# Patient Record
Sex: MALE | Race: WHITE | URBAN - METROPOLITAN AREA
[De-identification: names, ages, dates, MRNs, and addresses within clinical notes are randomized per-mention and may not be internally consistent; named-entity substitution may affect disease eponyms.]

---

## 2017-04-12 ENCOUNTER — OFFICE VISIT (OUTPATIENT)
Dept: PODIATRY | Facility: CLINIC | Age: 22
End: 2017-04-12
Payer: COMMERCIAL

## 2017-04-12 VITALS
HEIGHT: 76 IN | SYSTOLIC BLOOD PRESSURE: 133 MMHG | DIASTOLIC BLOOD PRESSURE: 75 MMHG | WEIGHT: 179 LBS | BODY MASS INDEX: 21.8 KG/M2

## 2017-04-12 DIAGNOSIS — B07.0 VERRUCA PLANTARIS: Primary | ICD-10-CM

## 2017-04-12 PROCEDURE — 17110 DESTRUCTION B9 LES UP TO 14: CPT | Performed by: PODIATRIST

## 2017-04-12 PROCEDURE — 99202 OFFICE O/P NEW SF 15 MIN: CPT | Mod: 25 | Performed by: PODIATRIST

## 2017-04-12 NOTE — PROGRESS NOTES
"Subjective:    Pt is seen today as a new pt referral with the c/c of painful lesions on the plantar aspect right foot.  This has been problematic for 4 month(s).  Pain is aggrevated by activity and relieved by rest.  Describes as burning pain and this is slowly getting worse.  Has tried OTC products without alleviation of lesion/symptoms.  Cryotherapy once before.  Patient is requesting more definitive treatment.    ROS: denies drainage, denies trauma, denies numbness, denies erythema or edema      No Known Allergies    No current outpatient prescriptions on file.       There is no problem list on file for this patient.      History reviewed. No pertinent past medical history.    History reviewed. No pertinent surgical history.    Family History   Problem Relation Age of Onset     DIABETES No family hx of      Coronary Artery Disease No family hx of        Social History   Substance Use Topics     Smoking status: Never Smoker     Smokeless tobacco: Not on file     Alcohol use Not on file         Exam:    /75 (BP Location: Right arm, Patient Position: Chair, Cuff Size: Adult Regular)  Ht 1.918 m (6' 3.5\")  Wt 81.2 kg (179 lb)  BMI 22.08 kg/m2.  Good historian.  A&O X 3.  Pulses DP, PT 2/4 b/l.  CRT < 3 seconds X 10 digits.  No edema or varicosities noted.  Sensation to light touch intact b/l.  Reflexes 2/4 b/l.  Skin has normal texture and turgor b/l.  Normal arch with weightbearing.  No forefoot or rear foot deformities noted.  MS 5/5 all compartments.  Normal ROM all fore foot and rearfoot joints.  No equinus.   Lesion right sub fourth met head  and has cauliflower appearance with obliterated skin lines and pain with lateral compression.  No subcutaneus masses noted.  Petechia capillary impingement noted within the lesions.   No drainage noted.    A/P    Verrucous lesions X 1 right foot.    Discussed treatment options and etiology of verrucous lesions at length.  Discussed various treatment options.  Would " like to try cryotherapy.  Risks, complications and efficacy were discussed.  Patient gave verbal understanding and knows this could blister up and will be limping for a few days.  No guarantees for outcome given.  Cryotherapy times 3.  RETURN TO CLINIC in 2-3 weeks.     Louie Blanco DPM, FACFAS

## 2017-04-12 NOTE — NURSING NOTE
"Chief Complaint   Patient presents with     Wart     r heel       Initial /75 (BP Location: Right arm, Patient Position: Chair, Cuff Size: Adult Regular)  Ht 1.918 m (6' 3.5\")  Wt 81.2 kg (179 lb)  BMI 22.08 kg/m2 Estimated body mass index is 22.08 kg/(m^2) as calculated from the following:    Height as of this encounter: 1.918 m (6' 3.5\").    Weight as of this encounter: 81.2 kg (179 lb).  Medication Reconciliation: complete  "

## 2017-04-12 NOTE — PATIENT INSTRUCTIONS
We wish you continued good healing. If you have any questions or concerns, please do not hesitate to contact us at [phone].      Please remember to call and schedule a follow up appointment if one was recommended at your earliest convenience.   PODIATRY CLINIC HOURS  TELEPHONE NUMBER    Dr. Louie Blanco D.P.M [facility]    Clinics:  [facility]        Leana Villagran MA  Medical Assistant  Tuesday 1PM-6PM  [facility]  Wednesday 7AM-2PM  [place]  Thursday 10AM-6PM  [facility] Friday 7AM-345PM  [place]  Specialty schedulers:   [phone] to make an appointment with any Specialty Provider.        Urgent Care locations:    [facility] Monday-Friday 5 pm - 9 pm. Saturday-Sunday 9 am -5pm    Monday-Friday 11 am - 9 pm Saturday 9 am - 5 pm     Monday-Sunday 12 noon-8PM [phone]     If you need a medication refill, please contact us you may need lab work and/or a follow up visit prior to your refill (i.e. Antifungal medications).    [employer] (secure e-mail communication and access to your chart) to send a message or to make an appointment.    If MRI needed please call Ashvin Patel at [phone]        Weight management plan: Patient was referred to their PCP to discuss a diet and exercise plan.

## 2017-04-12 NOTE — MR AVS SNAPSHOT
After Visit Summary   4/12/2017    Mihir Kang    MRN: [id number]           Patient Information     Date Of Birth          1995        Visit Information        Provider Department      4/12/2017 11:15 AM Louie Blanco, DPEBONY [facility]        Care Instructions    We wish you continued good healing. If you have any questions or concerns, please do not hesitate to contact us at [phone].      Please remember to call and schedule a follow up appointment if one was recommended at your earliest convenience.   PODIATRY CLINIC HOURS  TELEPHONE NUMBER    Dr. Louie Blanco DKaityPNICK FAC FAS    Clinics:  [facility]        Leana Villagran MA  Medical Assistant  Tuesday 1PM-6PM  [facility]  Wednesday 7AM-2PM  [place]  Thursday 10AM-6PM  [place] Friday 7AM-345PM  [place]  Specialty schedulers:   [phone] to make an appointment with any Specialty Provider.        Urgent Care locations:    [facility] Monday-Friday 5 pm - 9 pm. Saturday-Sunday 9 am -5pm    Monday-Friday 11 am - 9 pm Saturday 9 am - 5 pm     Monday-Sunday 12 noon-8PM [phone]     If you need a medication refill, please contact us you may need lab work and/or a follow up visit prior to your refill (i.e. Antifungal medications).    [employer] (secure e-mail communication and access to your chart) to send a message or to make an appointment.    If MRI needed please call Ashvin Patel at [phone]        Weight management plan: Patient was referred to their PCP to discuss a diet and exercise plan.          Follow-ups after your visit        Who to contact     If you have questions or need follow up information about today's clinic visit or your schedule please contact [facility] directly at [phone].  Normal or non-critical lab and imaging  "results will be communicated to you by MyChart, letter or phone within 4 business days after the clinic has received the results. If you do not hear from us within 7 days, please contact the clinic through "[employer]"t or phone. If you have a critical or abnormal lab result, we will notify you by phone as soon as possible.  Submit refill requests through Givit or call your pharmacy and they will forward the refill request to us. Please allow 3 business days for your refill to be completed.          Additional Information About Your Visit        Givit Information     Givit lets you send messages to your doctor, view your test results, renew your prescriptions, schedule appointments and more. To sign up, go to [email or web address] . Click on \"Log in\" on the left side of the screen, which will take you to the Welcome page. Then click on \"Sign up Now\" on the right side of the page.     You will be asked to enter the access code listed below, as well as some personal information. Please follow the directions to create your username and password.     Your access code is: [email or web address]  Expires: 2017 11:34 AM     Your access code will  in 90 days. If you need help or a new code, please call your [place] clinic or [phone].        Care EveryWhere ID     This is your Care EveryWhere ID. This could be used by other organizations to access your [place] medical records  [id number]        Your Vitals Were     Height BMI (Body Mass Index)                1.918 m (6' 3.5\") 22.08 kg/m2           Blood Pressure from Last 3 Encounters:   17 133/75    Weight from Last 3 Encounters:   17 81.2 kg (179 lb)              Today, you had the following     No orders found for display       Primary Care Provider    No Pcp Confirmed       No address on file        Thank you!     Thank you for choosing [facility]  for your care. Our goal is always to provide you with excellent care. Hearing " back from our patients is one way we can continue to improve our services. Please take a few minutes to complete the written survey that you may receive in the mail after your visit with us. Thank you!             Your Updated Medication List - Protect others around you: Learn how to safely use, store and throw away your medicines at www.disposemymeds.org.      Notice  As of 4/12/2017 11:34 AM    You have not been prescribed any medications.

## 2017-04-26 ENCOUNTER — OFFICE VISIT (OUTPATIENT)
Dept: PODIATRY | Facility: CLINIC | Age: 22
End: 2017-04-26
Payer: COMMERCIAL

## 2017-04-26 VITALS — WEIGHT: 178 LBS | HEART RATE: 66 BPM | BODY MASS INDEX: 21.95 KG/M2 | OXYGEN SATURATION: 99 %

## 2017-04-26 DIAGNOSIS — B07.0 VERRUCA PLANTARIS: Primary | ICD-10-CM

## 2017-04-26 PROCEDURE — 17110 DESTRUCTION B9 LES UP TO 14: CPT | Performed by: PODIATRIST

## 2017-04-26 NOTE — NURSING NOTE
"Chief Complaint   Patient presents with     Wart     left foot, 2nd treatment       Initial Pulse 66  Wt 80.7 kg (178 lb)  SpO2 99%  BMI 21.95 kg/m2 Estimated body mass index is 21.95 kg/(m^2) as calculated from the following:    Height as of 4/12/17: 1.918 m (6' 3.5\").    Weight as of this encounter: 80.7 kg (178 lb).  Medication Reconciliation: complete  "

## 2017-04-26 NOTE — PROGRESS NOTES
Subjective:    4/12/17  Pt is seen today as a new pt referral with the c/c of painful lesions on the plantar aspect right foot.  This has been problematic for 4 month(s).  Pain is aggrevated by activity and relieved by rest.  Describes as burning pain and this is slowly getting worse.  Has tried OTC products without alleviation of lesion/symptoms.  Cryotherapy once before.  Patient is requesting more definitive treatment.    4/26/17  Patient limping for a few days after first cryotherapy.  No pain or drainage now.      ROS:  denies erythema or edema      No Known Allergies    No current outpatient prescriptions on file.       There is no problem list on file for this patient.      No past medical history on file.    No past surgical history on file.    Family History   Problem Relation Age of Onset     DIABETES No family hx of      Coronary Artery Disease No family hx of        Social History   Substance Use Topics     Smoking status: Never Smoker     Smokeless tobacco: Not on file     Alcohol use Not on file         Exam:    Pulse 66  Wt 80.7 kg (178 lb)  SpO2 99%  BMI 21.95 kg/m2.  Good historian.  A&O X 3.  Pulses DP, PT 2/4 b/l.  CRT < 3 seconds X 10 digits.  No edema or varicosities noted.  Sensation to light touch intact b/l.  Reflexes 2/4 b/l.  Skin has normal texture and turgor b/l.  Normal arch with weightbearing.  No forefoot or rear foot deformities noted.  MS 5/5 all compartments.  Normal ROM all fore foot and rearfoot joints.  No equinus.   Lesion right sub fourth met head  and has cauliflower appearance with obliterated skin lines and pain with lateral compression.  No subcutaneus masses noted.  Petechia capillary impingement noted within the lesions.   No drainage noted.  Much  smaller and almost resolved    A/P    Verrucous lesions X 1 right foot.    Discussed that this is much smaller.  Cryotherapy times 3.  Will only return to clinic if not resolved.  .     Louie Blanco DPM,  FACFAS

## 2017-04-26 NOTE — MR AVS SNAPSHOT
After Visit Summary   4/26/2017    Mihir Kang    MRN: [id number]           Patient Information     Date Of Birth          1995        Visit Information        Provider Department      4/26/2017 9:15 AM Louie Blanco, DPEBONY [facility]        Care Instructions    We wish you continued good healing. If you have any questions or concerns, please do not hesitate to contact us at [phone].      Please remember to call and schedule a follow up appointment if one was recommended at your earliest convenience.   PODIATRY CLINIC HOURS  TELEPHONE NUMBER    Dr. Louie Blanco DKaityP.EBONY FAC FAS    Clinics:  [facility]        Leana Villagran MA  Medical Assistant  Tuesday 1PM-6PM  [facility]  Wednesday 7AM-2PM  [place]  Thursday 10AM-6PM  [place] Friday 7AM-345PM  [place]  Specialty schedulers:   [phone] to make an appointment with any Specialty Provider.        Urgent Care locations:    [facility] Monday-Friday 5 pm - 9 pm. Saturday-Sunday 9 am -5pm    Monday-Friday 11 am - 9 pm Saturday 9 am - 5 pm     Monday-Sunday 12 noon-8PM [phone]     If you need a medication refill, please contact us you may need lab work and/or a follow up visit prior to your refill (i.e. Antifungal medications).    [employer] (secure e-mail communication and access to your chart) to send a message or to make an appointment.    If MRI needed please call Ashvin Patel at [phone]        Weight management plan: Patient was referred to their PCP to discuss a diet and exercise plan.          Follow-ups after your visit        Who to contact     If you have questions or need follow up information about today's clinic visit or your schedule please contact [facility] directly at [phone].  Normal or non-critical lab and imaging  "results will be communicated to you by MyChart, letter or phone within 4 business days after the clinic has received the results. If you do not hear from us within 7 days, please contact the clinic through [employer]ip[employer] or phone. If you have a critical or abnormal lab result, we will notify you by phone as soon as possible.  Submit refill requests through [employer] or call your pharmacy and they will forward the refill request to us. Please allow 3 business days for your refill to be completed.          Additional Information About Your Visit        [employer] Information     LaraPharm lets you send messages to your doctor, view your test results, renew your prescriptions, schedule appointments and more. To sign up, go to [email or web address] . Click on \"Log in\" on the left side of the screen, which will take you to the Welcome page. Then click on \"Sign up Now\" on the right side of the page.     You will be asked to enter the access code listed below, as well as some personal information. Please follow the directions to create your username and password.     Your access code is: [email or web address]  Expires: 2017 11:34 AM     Your access code will  in 90 days. If you need help or a new code, please call your [facility] or [phone].        Care EveryWhere ID     This is your Care EveryWhere ID. This could be used by other organizations to access your [place] medical records  [id number]        Your Vitals Were     Pulse Pulse Oximetry BMI (Body Mass Index)             66 99% 21.95 kg/m2          Blood Pressure from Last 3 Encounters:   17 133/75    Weight from Last 3 Encounters:   17 80.7 kg (178 lb)   17 81.2 kg (179 lb)              Today, you had the following     No orders found for display       Primary Care Provider    No Pcp Confirmed       No address on file        Thank you!     Thank you for choosing [facility]  for your care. Our goal is always to provide you with " excellent care. Hearing back from our patients is one way we can continue to improve our services. Please take a few minutes to complete the written survey that you may receive in the mail after your visit with us. Thank you!             Your Updated Medication List - Protect others around you: Learn how to safely use, store and throw away your medicines at www.disposemymeds.org.      Notice  As of 4/26/2017  9:15 AM    You have not been prescribed any medications.

## 2017-04-26 NOTE — PATIENT INSTRUCTIONS
We wish you continued good healing. If you have any questions or concerns, please do not hesitate to contact us at [phone].      Please remember to call and schedule a follow up appointment if one was recommended at your earliest convenience.   PODIATRY CLINIC HOURS  TELEPHONE NUMBER    Dr. Louie Blanco D.P.M [facility]    Clinics:  [facility]        Leana Villagran MA  Medical Assistant  Tuesday 1PM-6PM  [facility]  Wednesday 7AM-2PM  [place]  Thursday 10AM-6PM  [place] Friday 7AM-345PM  [place]  Specialty schedulers:   [phone] to make an appointment with any Specialty Provider.        Urgent Care locations:    [facility] Monday-Friday 5 pm - 9 pm. Saturday-Sunday 9 am -5pm    Monday-Friday 11 am - 9 pm Saturday 9 am - 5 pm     Monday-Sunday 12 noon-8PM [phone]     If you need a medication refill, please contact us you may need lab work and/or a follow up visit prior to your refill (i.e. Antifungal medications).    StrategyEyehart (secure e-mail communication and access to your chart) to send a message or to make an appointment.    If MRI needed please call Ashvin Patel at [phone]        Weight management plan: Patient was referred to their PCP to discuss a diet and exercise plan.